# Patient Record
Sex: FEMALE | Race: WHITE
[De-identification: names, ages, dates, MRNs, and addresses within clinical notes are randomized per-mention and may not be internally consistent; named-entity substitution may affect disease eponyms.]

---

## 2017-12-31 ENCOUNTER — HEALTH MAINTENANCE LETTER (OUTPATIENT)
Age: 27
End: 2017-12-31

## 2018-05-12 ENCOUNTER — NURSE TRIAGE (OUTPATIENT)
Dept: NURSING | Facility: CLINIC | Age: 28
End: 2018-05-12

## 2018-05-12 NOTE — TELEPHONE ENCOUNTER
Patient states has glass in her left eye from a candle.  States she may also have some in mouth.  Advised to go to ED now and offered to assist to find closest ED; patient asked if she could go to an eye doctor and [facility] stated not open due to the weekend.  Patient stated she would find an ED.  Reason for Disposition    [1] Sharp FB (even if FB was removed) AND [2] any pain present now    Additional Information    Negative: Doesn't sound like foreign body (FB) in the eye    Negative: Foreign body is a piece of chemical    Negative: Foreign body (FB) stuck on eyeball  (Exception: contact lens)    Protocols used: EYE - FOREIGN BODY-ADULT-